# Patient Record
Sex: FEMALE | Race: WHITE | Employment: FULL TIME | URBAN - METROPOLITAN AREA
[De-identification: names, ages, dates, MRNs, and addresses within clinical notes are randomized per-mention and may not be internally consistent; named-entity substitution may affect disease eponyms.]

---

## 2017-01-23 DIAGNOSIS — H66.93 RECURRENT ACUTE OTITIS MEDIA OF BOTH EARS: Primary | ICD-10-CM

## 2017-01-24 NOTE — PROGRESS NOTES
Contacted with patient having symptoms of otitis media.  Will treat with augmentin, but if symptoms continue, will need to see her in a clinic visit.

## 2017-05-01 NOTE — PATIENT INSTRUCTIONS
Birth Control Methods  What is contraception?   Birth control and contraception are terms used to refer to ways to prevent pregnancy. There are many ways to prevent pregnancy when you have sexual intercourse. They include the use of hormone medicines, contraceptive devices (barriers and IUDs), periods of avoiding sex, spermicides, withdrawal, and devices and surgery for sterilization. Some birth control methods work better than others.  You may want to choose a kind of birth control that will also help keep you from getting sexually transmitted disease (STD). Sometimes you may need to use more than one method to try to prevent pregnancy AND infection. You can use latex or polyurethane male condoms or the female condom to try to avoid getting STDs. They are the only birth control methods that will lessen your risk of being infected with HIV, the virus that causes AIDS. Hormones, natural family planning, and withdrawal do not give any protection against infection.  What are the different methods of contraception?   Hormone Medicines  Birth control pills, shots, vaginal rings, skin patches, and implants contain manmade forms of the hormones estrogen and/or progesterone. The hormones stop a woman's ovaries from releasing an egg each month. They also make it harder for sperm to get into the uterus or for a fertilized egg to stay in the uterus.  Birth control pills are taken according to a daily schedule prescribed by your healthcare provider.   One shot of Depo-Provera, which contains progesterone, can prevent pregnancy for 3 months.   Vaginal rings are flexible rings put into the vagina. The rings release hormones into the body. A ring stays in the vagina for 3 weeks. Then it is removed. After 1 week a new ring is put into the vagina and the cycle is repeated.   Patches containing hormones may be put on the skin. A new patch is worn every week for 3 weeks. During the 4th week, no patch is worn. Then the  cycle is repeated.   The implant (Implanon) is a small, thin capsule containing progesterone. It is put under the skin of a woman's arm. The implant prevents pregnancy for up to 3 years.  You will need to see your healthcare provider to get any of these hormonal forms of birth control.  Contraceptive Devices  Most contraceptive devices form physical or chemical barriers that stop sperm from getting into the uterus.  The male condom is a tube of thin material. (Latex rubber or polyurethane is best.) It is rolled over the erect penis before the penis touches any part of a woman's genital area. The male condom is the best protection against STDs, including HIV and hepatitis B.   The female condom is a 7-inch-long pouch. The pouch is made of polyurethane. It has 2 flexible rings. It is inserted into the vagina before sex. The female condom covers the cervix, vagina, and area around the vagina. It may protect against some STDs, such as HIV and hepatitis B.   Spermicides are chemicals that kill sperm. They are available as foam, jelly, foaming tablets, vaginal suppositories, or cream. They are inserted into the vagina no more than 30 minutes before sex. Spermicides should NOT be used alone. They work much better when they are used with another form of birth control, such as a condom or diaphragm. Spermicides do not protect against STDs.   The sponge is a round, soft piece of polyurethane foam. It is soaked with a spermicide. No more than 24 hours before intercourse, the sponge is dampened with water and inserted into the vagina against the cervix.   The diaphragm is a soft rubber dome stretched over a flexible ring. No more than 6 hours before sex, you fill the diaphragm with a spermicidal jelly or cream and put it into the vagina.   The intrauterine device (IUD) is a small plastic device containing copper or hormones. The IUD prevents the egg from being fertilized or implanting and growing in the uterus. An IUD is put  into the uterus by your healthcare provider. Depending on the type, it may be kept in the uterus 5 to 10 years before it must be replaced with a new one.  You can buy condoms, spermicides, and sponges at drug and grocery stores without a prescription. A diaphragm needs to be fitted by a healthcare provider. If you choose to use an IUD, you will need to see your provider for insertion of the IUD.  Sterilization  Sterilization is a procedure done to close the tubes that carry the sperm or eggs. It may be done with surgery or special devices put into the tubes. A woman or man who is sterilized will no longer be able to have children. These procedures are usually permanent methods of birth control.   Removal of the uterus (hysterectomy) causes a woman to be sterile and unable to get pregnant. Hysterectomy is usually only done if there are problems with the female organs, such as fibroids or abnormal menstrual bleeding.  Natural Family Planning (Periodic Abstinence) and the Withdrawal Method  The natural family planning methods of birth control do not use any devices, drugs, or surgery. To prevent pregnancy you avoid having sex on certain days of each menstrual cycle. Other methods of birth control are more reliable.  The withdrawal method involves removing the penis from the vagina before ejaculation, when semen starts coming out. Often sperm get into the vagina before or during withdrawal. This method is unreliable. It often does not prevent pregnancy.  Emergency Contraception  A pill for emergency birth control may be taken to prevent pregnancy soon after you have had sex without birth control. It may also be used when another method of birth control has failed (for example, if a condom breaks). The pill contains a hormone that will prevent pregnancy if it is taken very soon after intercourse (within 3 to 5 days, depending on the medicine). In many areas, the pills are available at drug stores without a prescription  for women over 18 years old.   A copper intrauterine device (IUD) is another type of emergency birth control. The IUD may be put into the uterus by your healthcare provider within 5 days after unprotected sex. It may prevent pregnancy by stopping fertilization or keeping a fertilized egg from attaching to the womb.  How well do the various methods prevent pregnancy?   The following chart shows the typical failure rates of the different kinds of birth control methods. The failure rate is the number of pregnancies expected per 100 women during 1 year of using each method. The failure rate can depend on how correctly each method is followed. If a method is used perfectly, the failure rate is lower than the typical rate shown here. Use of more than 1 method (for example, birth control pills and condoms) can decrease the chances of failure.                               Percentage of Women Having an     Birth Control             Unintended Pregnancy within the        Method                       First Year of Use    -----------------------------------------------------------                                   Typical Use    Perfect Use    -----------------------------------------------------------   Spermicides                         29%          18%   Natural Family Planning     (Periodic Abstinence)                           9%     Symptothermal method                            2%   Withdrawal                          18%           4%   Diaphragm with Spermicide           16%           6%   Condom     Female                            27%           5%     Male                              17%           2%   Sponge     Women who have given birth        32%          26%     Women who have not given birth    16%           9%   Pill                                 9%           0.3%   IUD     with copper                        1%           0.6%     with hormones                      0.1%         0.1%   Shot (Depo-Provera)                   7%           0.3%   Implant                              1%           0.05%   Patch (Ortho Evra)                   8%           0.3%   Vaginal ring (NuvaRing)              8%           0.3%   Female Sterilization                 0.7%         0.5%   Male Sterilization                   0.2%         0.1%   No Method                           85%          85%   -----------------------------------------------------------      Note: These failure rates are modified from the [facility] January 2008. Facts on Contraceptive Use. Available at http://www.guttmacher.org/pubs/fb_contr_use.html        Preventive Health Recommendations  Female Ages 18 to 25     Yearly exam:     See your health care provider every year in order to  o Review health changes.   o Discuss preventive care.    o Review your medicines if your doctor has prescribed any.      You should be tested each year for STDs (sexually transmitted diseases).       After age 20, talk to your provider about how often you should have cholesterol testing.      Starting at age 21, get a Pap test every three years. If you have an abnormal result, your doctor may have you test more often.      If you are at risk for diabetes, you should have a diabetes test (fasting glucose).     Shots:     Get a flu shot each year.     Get a tetanus shot every 10 years.     Consider getting the shot (vaccine) that prevents cervical cancer (Gardasil).    Nutrition:     Eat at least 5 servings of fruits and vegetables each day.    Eat whole-grain bread, whole-wheat pasta and brown rice instead of white grains and rice.    Talk to your provider about Calcium and Vitamin D.     Lifestyle    Exercise at least 150 minutes a week each week (30 minutes a day, 5 days a week). This will help you control your weight and prevent disease.    Limit alcohol to one drink per day.    No smoking.     Wear sunscreen to prevent skin cancer.    See your dentist every six months for an  exam and cleaning.    [facility]    If you have any questions regarding to your visit please contact your care team:       Team Red:   Clinic Hours Telephone Number   Dr. Jackie Solo, NP   7am-7pm  Monday - Thursday   7am-5pm  Fridays  [phone]  (Appointment scheduling available 24/7)    Questions about your visit?   Team Line  [phone]   Urgent Care - [place] and [facility] - 11am-9pm Monday-Friday Saturday-Sunday- 9am-5pm   [place] - 5pm-9pm Monday-Friday Saturday-Sunday- 9am-5pm  [phone] - [facility]  [phone] - [place]       What options do I have for visits at the clinic other than the traditional office visit?  To expand how we care for you, many of our providers are utilizing electronic visits (e-visits) and telephone visits, when medically appropriate, for interactions with their patients rather than a visit in the clinic.   We also offer nurse visits for many medical concerns. Just like any other service, we will bill your insurance company for this type of visit based on time spent on the phone with your provider. Not all insurance companies cover these visits. Please check with your medical insurance if this type of visit is covered. You will be responsible for any charges that are not paid by your insurance.      E-visits via [employer]:  generally incur a $35.00 fee.  Telephone visits:  Time spent on the phone: *charged based on time that is spent on the phone in increments of 10 minutes. Estimated cost:   5-10 mins $30.00   11-20 mins. $59.00   21-30 mins. $85.00     Use [employer] (secure email communication and access to your chart) to send your primary care provider a message or make an appointment. Ask someone on your Team how to sign up for [employer].  For a Price Quote for your services, please call our Consumer Price Line at [phone].      As always, Thank you for trusting us with your health  care needs!    Discharged by Essie Mackey MA.

## 2017-05-01 NOTE — PROGRESS NOTES
SUBJECTIVE:     CC: Gayathri Llamas is an 22 year old woman who presents for preventive health visit.     Healthy Habits:    Do you get at least three servings of calcium containing foods daily (dairy, green leafy vegetables, etc.)? yes    Amount of exercise or daily activities, outside of work: none    Problems taking medications regularly No    Medication side effects: No    Have you had an eye exam in the past two years? no    Do you see a dentist twice per year? yes    Do you have sleep apnea, excessive snoring or daytime drowsiness?no        {additional problems to add:466580}    Today's PHQ-2 Score:   PHQ-2 ( 1999 Pfizer) 5/6/2013   Q1: Little interest or pleasure in doing things 0   Q2: Feeling down, depressed or hopeless 0   PHQ-2 Score 0       Abuse: Current or Past(Physical, Sexual or Emotional)- No  Do you feel safe in your environment - Yes    Social History   Substance Use Topics     Smoking status: Never Smoker     Smokeless tobacco: Never Used     Alcohol use No     The patient does not drink >3 drinks per day nor >7 drinks per week.    Recent Labs   Lab Test  10/04/16   1123  07/21/14   1303  10/07/11   1128   CHOL  123  145  120   HDL  52   --   45*   LDL  60   --   62   TRIG  55   --   65   CHOLHDLRATIO   --    --   2.7   NHDL  71   --    --        Reviewed orders with patient.  Reviewed health maintenance and updated orders accordingly - Yes    Mammo Decision Support:  {Mammo:640675}    Pertinent mammograms are reviewed under the imaging tab.  History of abnormal Pap smear: {PAP HX:770319}    Reviewed and updated as needed this visit by clinical staff         Reviewed and updated as needed this visit by Provider        {HISTORY OPTIONS:409797}    ROS:  {FEMALE PREVENTATIVE ROS:984494}    {CHRONICPROBDATA:446074}  OBJECTIVE:     There were no vitals taken for this visit.  EXAM:  {Exam Choices:439688}    ASSESSMENT/PLAN:     {Diag Picklist:370134}    COUNSELING:   {FEMALE COUNSELING  "MESSAGES:279734::\"Reviewed preventive health counseling, as reflected in patient instructions\"}    {Blood Pressure Screenin}     reports that she has never smoked. She has never used smokeless tobacco.  {Tobacco Cessation needed for ACO -- Delete if patient is a non-smoker:158302}  Estimated body mass index is 52.95 kg/(m^2) as calculated from the following:    Height as of 10/4/16: 5' 10\" (1.778 m).    Weight as of 16: 369 lb (167.4 kg).   {Weight Management Plan needed for ACO:578445}    Counseling Resources:  ATP IV Guidelines  Pooled Cohorts Equation Calculator  Breast Cancer Risk Calculator  FRAX Risk Assessment  ICSI Preventive Guidelines  Dietary Guidelines for Americans, 2010  USDA's MyPlate  ASA Prophylaxis  Lung CA Screening    EDUARDA Doan [facility] Center[facility]ERST[facility]  "

## 2017-05-02 DIAGNOSIS — Z30.011 ENCOUNTER FOR INITIAL PRESCRIPTION OF CONTRACEPTIVE PILLS: ICD-10-CM

## 2017-05-02 RX ORDER — NORETHINDRONE ACETATE AND ETHINYL ESTRADIOL .02; 1 MG/1; MG/1
1 TABLET ORAL DAILY
Qty: 63 TABLET | Refills: 4 | Status: SHIPPED | OUTPATIENT
Start: 2017-05-02 | End: 2019-12-02

## 2017-05-02 NOTE — TELEPHONE ENCOUNTER
norethindrone-ethinyl estradiol (MICROGESTIN 1/20) 1-20 MG-MCG per tablet    Sent the message to the prescribing provider JOSIANE Gilliland out of [facility]. Cammy Solo CNP is out of office.

## 2017-05-02 NOTE — TELEPHONE ENCOUNTER
Reason for Call:  Other prescription    Detailed comments: Patient states she is in [place] and forgot to take her birthcontrol meds with. Requesting a month worth prescription to be sent [facility] [phone]. Patient scheduled a physical with you on May 11th .     Phone Number Patient can be reached at: Cell number on file:    Telephone Information:   Mobile [phone]       Best Time: any    Can we leave a detailed message on this number? YES    Call taken on 5/2/2017 at 1:06 PM by Elyse Forde

## 2017-05-11 ENCOUNTER — OFFICE VISIT (OUTPATIENT)
Dept: FAMILY MEDICINE | Facility: CLINIC | Age: 22
End: 2017-05-11
Payer: COMMERCIAL

## 2017-05-11 VITALS
DIASTOLIC BLOOD PRESSURE: 90 MMHG | BODY MASS INDEX: 41.02 KG/M2 | OXYGEN SATURATION: 98 % | WEIGHT: 293 LBS | SYSTOLIC BLOOD PRESSURE: 110 MMHG | HEIGHT: 71 IN | TEMPERATURE: 98 F | HEART RATE: 76 BPM

## 2017-05-11 DIAGNOSIS — Z12.4 SCREENING FOR CERVICAL CANCER: ICD-10-CM

## 2017-05-11 DIAGNOSIS — E66.01 MORBID OBESITY DUE TO EXCESS CALORIES (H): ICD-10-CM

## 2017-05-11 DIAGNOSIS — Z30.09 ENCOUNTER FOR OTHER GENERAL COUNSELING OR ADVICE ON CONTRACEPTION: Primary | ICD-10-CM

## 2017-05-11 DIAGNOSIS — Z11.3 SCREEN FOR STD (SEXUALLY TRANSMITTED DISEASE): ICD-10-CM

## 2017-05-11 PROCEDURE — 99213 OFFICE O/P EST LOW 20 MIN: CPT | Performed by: NURSE PRACTITIONER

## 2017-05-11 PROCEDURE — G0145 SCR C/V CYTO,THINLAYER,RESCR: HCPCS | Performed by: NURSE PRACTITIONER

## 2017-05-11 PROCEDURE — 87491 CHLMYD TRACH DNA AMP PROBE: CPT | Performed by: NURSE PRACTITIONER

## 2017-05-11 PROCEDURE — 87591 N.GONORRHOEAE DNA AMP PROB: CPT | Performed by: NURSE PRACTITIONER

## 2017-05-11 NOTE — MR AVS SNAPSHOT
After Visit Summary   5/11/2017    Gayathri Llamas    MRN: [id number]           Patient Information     Date Of Birth          1995        Visit Information        Provider Department      5/11/2017 11:20 AM Cammy Solo APRN [facility]        Today's Diagnoses     Encounter for other general counseling or advice on contraception    -  1    Screen for STD (sexually transmitted disease)        Screening for cervical cancer          Care Instructions                    Birth Control Methods  What is contraception?   Birth control and contraception are terms used to refer to ways to prevent pregnancy. There are many ways to prevent pregnancy when you have sexual intercourse. They include the use of hormone medicines, contraceptive devices (barriers and IUDs), periods of avoiding sex, spermicides, withdrawal, and devices and surgery for sterilization. Some birth control methods work better than others.  You may want to choose a kind of birth control that will also help keep you from getting sexually transmitted disease (STD). Sometimes you may need to use more than one method to try to prevent pregnancy AND infection. You can use latex or polyurethane male condoms or the female condom to try to avoid getting STDs. They are the only birth control methods that will lessen your risk of being infected with HIV, the virus that causes AIDS. Hormones, natural family planning, and withdrawal do not give any protection against infection.  What are the different methods of contraception?   Hormone Medicines  Birth control pills, shots, vaginal rings, skin patches, and implants contain manmade forms of the hormones estrogen and/or progesterone. The hormones stop a woman's ovaries from releasing an egg each month. They also make it harder for sperm to get into the uterus or for a fertilized egg to stay in the uterus.  Birth control pills are taken according to a daily schedule  prescribed by your healthcare provider.   One shot of Depo-Provera, which contains progesterone, can prevent pregnancy for 3 months.   Vaginal rings are flexible rings put into the vagina. The rings release hormones into the body. A ring stays in the vagina for 3 weeks. Then it is removed. After 1 week a new ring is put into the vagina and the cycle is repeated.   Patches containing hormones may be put on the skin. A new patch is worn every week for 3 weeks. During the 4th week, no patch is worn. Then the cycle is repeated.   The implant (Implanon) is a small, thin capsule containing progesterone. It is put under the skin of a woman's arm. The implant prevents pregnancy for up to 3 years.  You will need to see your healthcare provider to get any of these hormonal forms of birth control.  Contraceptive Devices  Most contraceptive devices form physical or chemical barriers that stop sperm from getting into the uterus.  The male condom is a tube of thin material. (Latex rubber or polyurethane is best.) It is rolled over the erect penis before the penis touches any part of a woman's genital area. The male condom is the best protection against STDs, including HIV and hepatitis B.   The female condom is a 7-inch-long pouch. The pouch is made of polyurethane. It has 2 flexible rings. It is inserted into the vagina before sex. The female condom covers the cervix, vagina, and area around the vagina. It may protect against some STDs, such as HIV and hepatitis B.   Spermicides are chemicals that kill sperm. They are available as foam, jelly, foaming tablets, vaginal suppositories, or cream. They are inserted into the vagina no more than 30 minutes before sex. Spermicides should NOT be used alone. They work much better when they are used with another form of birth control, such as a condom or diaphragm. Spermicides do not protect against STDs.   The sponge is a round, soft piece of polyurethane foam. It is soaked with a  spermicide. No more than 24 hours before intercourse, the sponge is dampened with water and inserted into the vagina against the cervix.   The diaphragm is a soft rubber dome stretched over a flexible ring. No more than 6 hours before sex, you fill the diaphragm with a spermicidal jelly or cream and put it into the vagina.   The intrauterine device (IUD) is a small plastic device containing copper or hormones. The IUD prevents the egg from being fertilized or implanting and growing in the uterus. An IUD is put into the uterus by your healthcare provider. Depending on the type, it may be kept in the uterus 5 to 10 years before it must be replaced with a new one.  You can buy condoms, spermicides, and sponges at drug and grocery stores without a prescription. A diaphragm needs to be fitted by a healthcare provider. If you choose to use an IUD, you will need to see your provider for insertion of the IUD.  Sterilization  Sterilization is a procedure done to close the tubes that carry the sperm or eggs. It may be done with surgery or special devices put into the tubes. A woman or man who is sterilized will no longer be able to have children. These procedures are usually permanent methods of birth control.   Removal of the uterus (hysterectomy) causes a woman to be sterile and unable to get pregnant. Hysterectomy is usually only done if there are problems with the female organs, such as fibroids or abnormal menstrual bleeding.  Natural Family Planning (Periodic Abstinence) and the Withdrawal Method  The natural family planning methods of birth control do not use any devices, drugs, or surgery. To prevent pregnancy you avoid having sex on certain days of each menstrual cycle. Other methods of birth control are more reliable.  The withdrawal method involves removing the penis from the vagina before ejaculation, when semen starts coming out. Often sperm get into the vagina before or during withdrawal. This method is  unreliable. It often does not prevent pregnancy.  Emergency Contraception  A pill for emergency birth control may be taken to prevent pregnancy soon after you have had sex without birth control. It may also be used when another method of birth control has failed (for example, if a condom breaks). The pill contains a hormone that will prevent pregnancy if it is taken very soon after intercourse (within 3 to 5 days, depending on the medicine). In many areas, the pills are available at drug stores without a prescription for women over 18 years old.   A copper intrauterine device (IUD) is another type of emergency birth control. The IUD may be put into the uterus by your healthcare provider within 5 days after unprotected sex. It may prevent pregnancy by stopping fertilization or keeping a fertilized egg from attaching to the womb.  How well do the various methods prevent pregnancy?   The following chart shows the typical failure rates of the different kinds of birth control methods. The failure rate is the number of pregnancies expected per 100 women during 1 year of using each method. The failure rate can depend on how correctly each method is followed. If a method is used perfectly, the failure rate is lower than the typical rate shown here. Use of more than 1 method (for example, birth control pills and condoms) can decrease the chances of failure.                               Percentage of Women Having an     Birth Control             Unintended Pregnancy within the        Method                       First Year of Use    -----------------------------------------------------------                                   Typical Use    Perfect Use    -----------------------------------------------------------   Spermicides                         29%          18%   Natural Family Planning     (Periodic Abstinence)                           9%     Symptothermal method                            2%   Withdrawal                           18%           4%   Diaphragm with Spermicide           16%           6%   Condom     Female                            27%           5%     Male                              17%           2%   Sponge     Women who have given birth        32%          26%     Women who have not given birth    16%           9%   Pill                                 9%           0.3%   IUD     with copper                        1%           0.6%     with hormones                      0.1%         0.1%   Shot (Depo-Provera)                  7%           0.3%   Implant                              1%           0.05%   Patch (Ortho Evra)                   8%           0.3%   Vaginal ring (NuvaRing)              8%           0.3%   Female Sterilization                 0.7%         0.5%   Male Sterilization                   0.2%         0.1%   No Method                           85%          85%   -----------------------------------------------------------      Note: These failure rates are modified from the [facility] [place] [facility] January 2008. Facts on Contraceptive Use. Available at http://www.[facility]macher.org/pubs/fb_contr_use.html        Preventive Health Recommendations  Female Ages 18 to 25     Yearly exam:     See your health care provider every year in order to  o Review health changes.   o Discuss preventive care.    o Review your medicines if your doctor has prescribed any.      You should be tested each year for STDs (sexually transmitted diseases).       After age 20, talk to your provider about how often you should have cholesterol testing.      Starting at age 21, get a Pap test every three years. If you have an abnormal result, your doctor may have you test more often.      If you are at risk for diabetes, you should have a diabetes test (fasting glucose).     Shots:     Get a flu shot each year.     Get a tetanus shot every 10 years.     Consider getting the shot (vaccine) that prevents cervical cancer  (Gardasil).    Nutrition:     Eat at least 5 servings of fruits and vegetables each day.    Eat whole-grain bread, whole-wheat pasta and brown rice instead of white grains and rice.    Talk to your provider about Calcium and Vitamin D.     Lifestyle    Exercise at least 150 minutes a week each week (30 minutes a day, 5 days a week). This will help you control your weight and prevent disease.    Limit alcohol to one drink per day.    No smoking.     Wear sunscreen to prevent skin cancer.    See your dentist every six months for an exam and cleaning.    [facility]    If you have any questions regarding to your visit please contact your care team:       Team Red:   Clinic Hours Telephone Number   Dr. Jackie Solo, NP   7am-7pm  Monday - Thursday   7am-5pm  Fridays  [phone]  (Appointment scheduling available 24/7)    Questions about your visit?   Team Line  [phone]   Urgent Care - [place] [facility] and [place] [facility] - 11am-9pm Monday-Friday Saturday-Sunday- 9am-5pm   [place] - 5pm-9pm Monday-Friday Saturday-Sunday- 9am-5pm  [phone] - [place]   [phone] - [place]       What options do I have for visits at the clinic other than the traditional office visit?  To expand how we care for you, many of our providers are utilizing electronic visits (e-visits) and telephone visits, when medically appropriate, for interactions with their patients rather than a visit in the clinic.   We also offer nurse visits for many medical concerns. Just like any other service, we will bill your insurance company for this type of visit based on time spent on the phone with your provider. Not all insurance companies cover these visits. Please check with your medical insurance if this type of visit is covered. You will be responsible for any charges that are not paid by your insurance.      E-visits via [employer]:  generally incur a $35.00 fee.  Telephone  visits:  Time spent on the phone: *charged based on time that is spent on the phone in increments of 10 minutes. Estimated cost:   5-10 mins $30.00   11-20 mins. $59.00   21-30 mins. $85.00     Use RFIDeashart (secure email communication and access to your chart) to send your primary care provider a message or make an appointment. Ask someone on your Team how to sign up for iPour[employer].  For a Price Quote for your services, please call our [employer] Price Line at [phone].      As always, Thank you for trusting us with your health care needs!    Discharged by Essie Mackey MA.          Follow-ups after your visit        Additional Services     OB/GYN REFERRAL       Your provider has referred you to:  FMG: [facility] [phone]   [email or web address]     Please be aware that coverage of these services is subject to the terms and limitations of your health insurance plan.  Call member services at your health plan with any benefit or coverage questions.      Please bring the following to your appointment:  >>   Any x-rays, CTs or MRIs which have been performed.  Contact the facility where they were done to arrange for  prior to your scheduled appointment.  Any new CT, MRI or other procedures ordered by your specialist must be performed at a [place] facility or coordinated by your clinic's referral office.    >>   List of current medications   >>   This referral request   >>   Any documents/labs given to you for this referral                  Who to contact     If you have questions or need follow up information about today's clinic visit or your schedule please contact [facility] directly at [phone].  Normal or non-critical lab and imaging results will be communicated to you by MyChart, letter or phone within 4 business days after the clinic has received the results. If you do not hear from us within 7 days, please contact  "the clinic through [employer] or phone. If you have a critical or abnormal lab result, we will notify you by phone as soon as possible.  Submit refill requests through [employer] or call your pharmacy and they will forward the refill request to us. Please allow 3 business days for your refill to be completed.          Additional Information About Your Visit        [employer]cepthart Information     [employer] lets you send messages to your doctor, view your test results, renew your prescriptions, schedule appointments and more. To sign up, go to [email or web address] . Click on \"Log in\" on the left side of the screen, which will take you to the Welcome page. Then click on \"Sign up Now\" on the right side of the page.     You will be asked to enter the access code listed below, as well as some personal information. Please follow the directions to create your username and password.     Your access code is: [email or web address]  Expires: 2017 12:44 PM     Your access code will  in 90 days. If you need help or a new code, please call your [place] clinic or [phone].        Care EveryWhere ID     This is your Care EveryWhere ID. This could be used by other organizations to access your [place] medical records  [id number]        Your Vitals Were     Pulse Temperature Height Last Period Pulse Oximetry Breastfeeding?    76 98  F (36.7  C) (Oral) 5' 10.5\" (1.791 m) 2017 98% No    BMI (Body Mass Index)                   52.2 kg/m2            Blood Pressure from Last 3 Encounters:   17 110/90   16 113/62   10/04/16 120/60    Weight from Last 3 Encounters:   17 (!) 369 lb (167.4 kg)   16 (!) 369 lb (167.4 kg)   10/04/16 (!) 362 lb 2 oz (164.3 kg)              We Performed the Following     CHLAMYDIA TRACHOMATIS PCR     NEISSERIA GONORRHOEA PCR     OB/GYN REFERRAL     Pap imaged thin layer screen only - recommended age 21 - 24 years        Primary Care Provider Office Phone # Fax #    Malissa Ocampo " MD Emily [phone]       [facility] [address]    [facility] [place] [address]        Thank you!     Thank you for choosing [facility]  for your care. Our goal is always to provide you with excellent care. Hearing back from our patients is one way we can continue to improve our services. Please take a few minutes to complete the written survey that you may receive in the mail after your visit with us. Thank you!             Your Updated Medication List - Protect others around you: Learn how to safely use, store and throw away your medicines at www.disposemymeds.org.          This list is accurate as of: 5/11/17 12:44 PM.  Always use your most recent med list.                   Brand Name Dispense Instructions for use    ADVIL 200 MG tablet   Generic drug:  ibuprofen      Take 400 mg by mouth every 4 hours as needed.       norethindrone-ethinyl estradiol 1-20 MG-MCG per tablet    MICROGESTIN 1/20    63 tablet    Take 1 tablet by mouth daily

## 2017-05-11 NOTE — NURSING NOTE
"Chief Complaint   Patient presents with     Physical       Initial /90 (BP Location: Other (Comment), Patient Position: Chair, Cuff Size: Adult Regular)  Pulse 76  Temp 98  F (36.7  C) (Oral)  Ht 5' 10.5\" (1.791 m)  Wt (!) 369 lb (167.4 kg)  LMP 04/27/2017  SpO2 98%  Breastfeeding? No  BMI 52.2 kg/m2 Estimated body mass index is 52.2 kg/(m^2) as calculated from the following:    Height as of this encounter: 5' 10.5\" (1.791 m).    Weight as of this encounter: 369 lb (167.4 kg).  Medication Reconciliation: complete   Jane VIRAMONTES MA      "

## 2017-05-11 NOTE — PROGRESS NOTES
"  SUBJECTIVE:                                                    Gayathri Llamas is a 22 year old female who presents to clinic today for the following health issues:      Chief Complaint   Patient presents with     Contraception     Patient currently on oral contraception. Sexually active with 1 long-term partner. Does not desire pregnancy. Wants something she doesn't need to take every day.      Problem list and histories reviewed & adjusted, as indicated.  Additional history: as documented    Patient Active Problem List   Diagnosis     Vitamin D deficiency     Morbid obesity (H)     Hidradenitis suppurativa     ALT (SGPT) level raised     Myopia     History reviewed. No pertinent surgical history.    Social History   Substance Use Topics     Smoking status: Never Smoker     Smokeless tobacco: Never Used     Alcohol use Yes     Family History   Problem Relation Age of Onset     DIABETES Father      CANCER Maternal Aunt      Ovarian Cancer - earliest at age 33     CANCER Maternal Grandmother      CANCER Brother      Hypertension No family hx of      CEREBROVASCULAR DISEASE No family hx of      Thyroid Disease No family hx of      Glaucoma No family hx of      Macular Degeneration No family hx of            Reviewed and updated as needed this visit by clinical staff  Tobacco  Allergies  Meds  Problems  Med Hx  Surg Hx  Fam Hx  Soc Hx        Reviewed and updated as needed this visit by Provider  Problems         ROS:  Constitutional, gu systems are negative, except as otherwise noted.    OBJECTIVE:                                                    /90 (BP Location: Other (Comment), Patient Position: Chair, Cuff Size: Adult Regular)  Pulse 76  Temp 98  F (36.7  C) (Oral)  Ht 5' 10.5\" (1.791 m)  Wt (!) 369 lb (167.4 kg)  LMP 04/27/2017  SpO2 98%  Breastfeeding? No  BMI 52.2 kg/m2  Body mass index is 52.2 kg/(m^2).  GENERAL: healthy, alert and no distress   (female): normal female external " genitalia, normal urethral meatus, vaginal mucosa, normal cervix/adnexa/uterus without masses or discharge    Diagnostic Test Results:  No results found for this or any previous visit (from the past 24 hour(s)).     ASSESSMENT/PLAN:                                                        1. Encounter for other general counseling or advice on contraception  Reviewed contraceptive options with patient and she is interested in Mirena IUD. I think she would be a good candidate- os is fairly dilated for a nullip although her morbid obesity will make this a difficult insertion so recommend she follow up with GYN.  - OB/GYN REFERRAL    2. Screen for STD (sexually transmitted disease)    - NEISSERIA GONORRHOEA PCR  - CHLAMYDIA TRACHOMATIS PCR    3. Screening for cervical cancer    - Pap imaged thin layer screen only - recommended age 21 - 24 years    4. Morbid obesity due to excess calories (H)  As above      Follow up with OBGYN    EDUARDA Doan [facility]

## 2017-05-12 LAB
C TRACH DNA SPEC QL NAA+PROBE: NORMAL
N GONORRHOEA DNA SPEC QL NAA+PROBE: NORMAL
SPECIMEN SOURCE: NORMAL
SPECIMEN SOURCE: NORMAL

## 2017-05-15 LAB
COPATH REPORT: NORMAL
PAP: NORMAL

## 2017-05-26 ENCOUNTER — OFFICE VISIT (OUTPATIENT)
Dept: OBGYN | Facility: CLINIC | Age: 22
End: 2017-05-26
Attending: NURSE PRACTITIONER
Payer: COMMERCIAL

## 2017-05-26 VITALS — HEART RATE: 80 BPM | HEIGHT: 71 IN | BODY MASS INDEX: 41.02 KG/M2 | WEIGHT: 293 LBS

## 2017-05-26 DIAGNOSIS — Z30.430 ENCOUNTER FOR IUD INSERTION: Primary | ICD-10-CM

## 2017-05-26 DIAGNOSIS — Z32.00 PREGNANCY EXAMINATION OR TEST, PREGNANCY UNCONFIRMED: ICD-10-CM

## 2017-05-26 LAB — BETA HCG QUAL IFA URINE: NEGATIVE

## 2017-05-26 PROCEDURE — 99202 OFFICE O/P NEW SF 15 MIN: CPT | Mod: 25 | Performed by: OBSTETRICS & GYNECOLOGY

## 2017-05-26 PROCEDURE — 84703 CHORIONIC GONADOTROPIN ASSAY: CPT | Performed by: OBSTETRICS & GYNECOLOGY

## 2017-05-26 PROCEDURE — 58300 INSERT INTRAUTERINE DEVICE: CPT | Performed by: OBSTETRICS & GYNECOLOGY

## 2017-05-26 ASSESSMENT — PAIN SCALES - GENERAL: PAINLEVEL: NO PAIN (0)

## 2017-05-26 NOTE — MR AVS SNAPSHOT
"              After Visit Summary   5/26/2017    Gayathri Llamas    MRN: [id number]           Patient Information     Date Of Birth          1995        Visit Information        Provider Department      5/26/2017 10:00 AM Maggie Guzmán MD [facility]        Today's Diagnoses     Encounter for IUD insertion    -  1    Pregnancy examination or test, pregnancy unconfirmed           Follow-ups after your visit        Follow-up notes from your care team     Return in about 3 months (around 8/26/2017).      Who to contact     If you have questions or need follow up information about today's clinic visit or your schedule please contact [facility] directly at [phone].  Normal or non-critical lab and imaging results will be communicated to you by MyChart, letter or phone within 4 business days after the clinic has received the results. If you do not hear from us within 7 days, please contact the clinic through [employer]Doch[employer] or phone. If you have a critical or abnormal lab result, we will notify you by phone as soon as possible.  Submit refill requests through [employer] or call your pharmacy and they will forward the refill request to us. Please allow 3 business days for your refill to be completed.          Additional Information About Your Visit        MyChart Information     [employer] gives you secure access to your electronic health record. If you see a primary care provider, you can also send messages to your care team and make appointments. If you have questions, please call your primary care clinic.  If you do not have a primary care provider, please call [phone] and they will assist you.        Care EveryWhere ID     This is your Care EveryWhere ID. This could be used by other organizations to access your [place] medical records  [id number]        Your Vitals Were     Pulse Height Last Period BMI (Body Mass Index)          80 1.791 m (5' 10.5\") 05/25/2017 47.66 kg/m2      "    Blood Pressure from Last 3 Encounters:   05/11/17 110/90   11/17/16 113/62   10/04/16 120/60    Weight from Last 3 Encounters:   05/26/17 (!) 152.8 kg (336 lb 14.4 oz)   05/11/17 (!) 167.4 kg (369 lb)   11/17/16 (!) 167.4 kg (369 lb)              We Performed the Following     Beta HCG qual IFA urine     HC LEVONORGESTREL IU 52MG 5 YR     INSERTION INTRAUTERINE DEVICE        Primary Care Provider Office Phone # Fax #    Malissa Joan Diaz [phone]       [facility] [address]    [facility] [place] [address]        Thank you!     Thank you for choosing [facility]  for your care. Our goal is always to provide you with excellent care. Hearing back from our patients is one way we can continue to improve our services. Please take a few minutes to complete the written survey that you may receive in the mail after your visit with us. Thank you!             Your Updated Medication List - Protect others around you: Learn how to safely use, store and throw away your medicines at www.disposemymeds.org.          This list is accurate as of: 5/26/17 12:04 PM.  Always use your most recent med list.                   Brand Name Dispense Instructions for use    ADVIL 200 MG tablet   Generic drug:  ibuprofen      Take 400 mg by mouth every 4 hours as needed.       norethindrone-ethinyl estradiol 1-20 MG-MCG per tablet    MICROGESTIN 1/20    63 tablet    Take 1 tablet by mouth daily

## 2017-05-26 NOTE — NURSING NOTE
"Chief Complaint   Patient presents with     Contraception     IUD consult/   consent /  UPT ?       Initial Pulse 80  Ht 1.791 m (5' 10.5\")  Wt (!) 152.8 kg (336 lb 14.4 oz)  LMP 05/25/2017  BMI 47.66 kg/m2 Estimated body mass index is 47.66 kg/(m^2) as calculated from the following:    Height as of this encounter: 1.791 m (5' 10.5\").    Weight as of this encounter: 152.8 kg (336 lb 14.4 oz).  BP completed using cuff size: NA (Not Taken)    No obstetric history on file.    The following HM Due: NONE      The following patient reported/Care Every where data was sent to:  P ABSTRACT QUALITY INITIATIVES [24280]  n/a     N/a    Judy Angulo CMA  May 26, 2017           "

## 2017-05-26 NOTE — PROGRESS NOTES
OB/GYN New Consult  5/26/2017      NAME:  Gayathri Llamas  PCP:  Malissa Velez  MRN:  [id number]    Reason for Consult:  IUD  Referring Provider: Cammy Solo    Impression / Plan     22 year old with:      ICD-10-CM    1. Encounter for IUD insertion Z30.430 INSERTION INTRAUTERINE DEVICE     HC LEVONORGESTREL IU 52MG 5 YR   2. Pregnancy examination or test, pregnancy unconfirmed Z32.00 Beta HCG qual IFA urine     Beta HCG qual IFA urine   Body mass index is 47.66 kg/(m^2).     Contraceptive options were discussed. We also discussed the different types of IUDs including the risks and benefits. See below. IUD was inserted today, see below.    Patient will follow up in 3 months, sooner as needed    Chief Complaint     Chief Complaint   Patient presents with     Contraception     IUD consult/   consent /  UPT ?       HPI     Gayathri Llamas is a G0 22 year old female who is seen for IUD.  Patient saw Cammy Solo on 5/11/17 to discuss contraception.  Patient was interested in the Mirena IUD and was sent here for placement.  Chlamydia and GC were negative at that time.     Patient's last menstrual period was 05/25/2017.   Period were regular, heavy, lasting 7 days prior to staring the pill 7 months ago.  Now they are lighter, 4 days.  She has no side effects from the pill but prefers to not take a pill everyday.      Today no pain, no abnormal discharge.  No concern.      Date of Last Pap Smear:   Lab Results   Component Value Date    PAP NIL 05/11/2017       Problem List     Patient Active Problem List    Diagnosis Date Noted     Myopia 05/01/2015     Priority: Medium     ALT (SGPT) level raised 07/22/2014     Priority: Medium     Hidradenitis suppurativa 07/21/2014     Priority: Medium     Morbid obesity (H) 05/06/2013     Priority: Medium     Vitamin D deficiency 10/11/2011     Priority: Medium       Medications     Current Outpatient Prescriptions   Medication     norethindrone-ethinyl  "estradiol (MICROGESTIN 1/20) 1-20 MG-MCG per tablet     ibuprofen (ADVIL) 200 MG tablet     No current facility-administered medications for this visit.         Allergies     Allergies   Allergen Reactions     Tamiflu [Oseltamivir] Shortness Of Breath and Swelling     Sulfa Drugs Hives     hives       Past Medical/Surgical History     Past Medical History:   Diagnosis Date     Constipation      Menorrhagia     Tx-ORTHOTRICYCLIN       History reviewed. No pertinent surgical history.     Social History     Social History     Social History     Marital status: Single     Spouse name: N/A     Number of children: N/A     Years of education: N/A     Occupational History     Not on file.     Social History Main Topics     Smoking status: Never Smoker     Smokeless tobacco: Never Used     Alcohol use Yes     Drug use: No     Sexual activity: Yes     Partners: Male     Birth control/ protection: Condom, Pill     Other Topics Concern     Not on file     Social History Narrative       Family History      Family History   Problem Relation Age of Onset     DIABETES Father      CANCER Maternal Aunt      Ovarian Cancer - earliest at age 33     CANCER Maternal Grandmother      CANCER Brother      Hypertension No family hx of      CEREBROVASCULAR DISEASE No family hx of      Thyroid Disease No family hx of      Glaucoma No family hx of      Macular Degeneration No family hx of        ROS     A 6 organ review of systems was asked and the pertinent positives and negatives are listed in the HPI. All other organ systems can be considered negative.     Physical Exam   Vitals: Pulse 80  Ht 1.791 m (5' 10.5\")  Wt (!) 152.8 kg (336 lb 14.4 oz)  LMP 05/25/2017  BMI 47.66 kg/m2    General: Comfortable, no obvious distress, obese body habitus  Psych: Alert and orientated x 3. Appropriate affect, good insight.   : Normal female external genitalia.  No lesions.  Urethral meatus normal.  Speculum exam reveals a normal vaginal vault, normal " cervix.  Small amount of menstrual blood present. Bimanual exam reveals a normal, mobile, nontender uterus.  No cervical motion tenderness.  Adnexa nontender with no palpable masses.  Exam limited by habitus  Extremities: No peripheral edema, nontender       Labs/Imaging       Labs were reviewed in Epic       20 minutes was spent with patient, more than 50% counseling and coordinating care, exclusive of IUD insertion    Maggie Guzmán MD       IUD INSERTION  Gayathri Llamas is a 22 year old  Women who presents today requesting placement of an Mirena iud.    The patient meets and is agreeable to the following conditions:    TShe denies the possibility of pregnancy.     Pregnancy test today is negative.     We discussed risks, benefits, and alternatives including but not limited to:     Possibility of pregnancy and ectopic pregnancy.    Possibility of pelvic inflammatory disease, particularly in the first 20 days and with new partners.    Risk of uterine perforation or IUD expulsion.    Possibility of difficult removal.    Spotting or heavy bleeding.    Cramping, pain or infection during or after insertion.    The patient was given patient information on the IUD and the patient education brochure from the .  She understands the main indication for removal is abnormal bleeding.  The patient has given consent to proceed with placement of the IUD.  She wishes to proceed.  All questions answered.    PROCEDURE:    Type of IUD: Mirena   She is placed in a dorsal lithotomy potion and a pelvic exam is performed to determine the position of the uterus.  The cervix is identified and cleaned with betadine.  A single tooth tenaculum is applied to the anterior lip of the cervix for stabilization.   The uterus sounded to 8.0 cm. (Target sound depth is 6.5 cm to 8.5 cm.)  The IUD is inserted into the uterus under sterile conditions in the usual fashion.    Lot number [id number] and expiration date 12/19.  NDC#   28752-079-50 The IUD string is then cut to 4.0 cm.    The patient tolerated this procedure without immediate complication.  The patient is to return or call immediately for any unexplained fever, abdominal or pelvic pain, excessive bleeding, possibility of pregnancy, foul-smelling discharge, sense that the IUD has been expelled.  All questions were answered.  Patient is aware that the IUD will be effective for 5 years and then will need removal or replacement.  Barrier methods for the first week advised.    Return to clinic in 3 months for IUD check    Maggie Guzámn MD

## 2018-06-18 ENCOUNTER — OFFICE VISIT (OUTPATIENT)
Dept: OPTOMETRY | Facility: CLINIC | Age: 23
End: 2018-06-18
Payer: COMMERCIAL

## 2018-06-18 DIAGNOSIS — H52.13 MYOPIA OF BOTH EYES: Primary | ICD-10-CM

## 2018-06-18 PROCEDURE — 92015 DETERMINE REFRACTIVE STATE: CPT | Performed by: OPTOMETRIST

## 2018-06-18 PROCEDURE — 92014 COMPRE OPH EXAM EST PT 1/>: CPT | Performed by: OPTOMETRIST

## 2018-06-18 ASSESSMENT — VISUAL ACUITY
OD_CC: 20/20
OS_CC: 20/20
OD_SC: 20/50
OD_CC: 20/20
OS_CC: 20/20
CORRECTION_TYPE: GLASSES
METHOD: SNELLEN - LINEAR
OS_SC: 20/40

## 2018-06-18 ASSESSMENT — SLIT LAMP EXAM - LIDS
COMMENTS: NORMAL
COMMENTS: NORMAL

## 2018-06-18 ASSESSMENT — TONOMETRY
OD_IOP_MMHG: 10
OS_IOP_MMHG: 11
IOP_METHOD: TONOPEN

## 2018-06-18 ASSESSMENT — REFRACTION_WEARINGRX
SPECS_TYPE: SVL
OD_SPHERE: -1.00
OS_SPHERE: -1.00

## 2018-06-18 ASSESSMENT — EXTERNAL EXAM - RIGHT EYE: OD_EXAM: NORMAL

## 2018-06-18 ASSESSMENT — CUP TO DISC RATIO
OS_RATIO: 0.15
OD_RATIO: 0.15

## 2018-06-18 ASSESSMENT — REFRACTION_MANIFEST
OD_SPHERE: -1.00
OS_SPHERE: -1.00

## 2018-06-18 ASSESSMENT — EXTERNAL EXAM - LEFT EYE: OS_EXAM: NORMAL

## 2018-06-18 ASSESSMENT — CONF VISUAL FIELD
OS_NORMAL: 1
OD_NORMAL: 1

## 2018-06-18 NOTE — LETTER
6/18/2018         RE: Gayathri Llamas  [address] [facility] [address]        Dear Colleague,    Thank you for referring your patient, Gayathri Llamas, to the [facility]. Please see a copy of my visit note below.    Chief Complaint   Patient presents with     COMPREHENSIVE EYE EXAM         Last Eye Exam: 10/5/16  Dilated Previously: Yes    What are you currently using to see?  glasses        Distance Vision Acuity: Satisfied with vision    Near Vision Acuity: Satisfied with vision while reading  with glasses    Eye Comfort: good  Do you use eye drops? : No  Occupation or Hobbies:        Medical, surgical and family histories reviewed and updated 6/18/2018.       OBJECTIVE: See Ophthalmology exam    ASSESSMENT:    ICD-10-CM    1. Myopia of both eyes H52.13 EYE EXAM (SIMPLE-NONBILLABLE)     REFRACTION      PLAN:     Patient Instructions   Eyeglass prescription given.  No change in eyeglass prescription.    Recommend returning for dilated fundus exam. It is a more thorough exam of the retina.    Return in 1 year for a complete eye exam or sooner if needed.    Se Bain, CHRISTELLE           Again, thank you for allowing me to participate in the care of your patient.        Sincerely,        Se Bain, OD

## 2018-06-18 NOTE — PROGRESS NOTES
Chief Complaint   Patient presents with     COMPREHENSIVE EYE EXAM         Last Eye Exam: 10/5/16  Dilated Previously: Yes    What are you currently using to see?  glasses        Distance Vision Acuity: Satisfied with vision    Near Vision Acuity: Satisfied with vision while reading  with glasses    Eye Comfort: good  Do you use eye drops? : No  Occupation or Hobbies:        Medical, surgical and family histories reviewed and updated 6/18/2018.       OBJECTIVE: See Ophthalmology exam    ASSESSMENT:    ICD-10-CM    1. Myopia of both eyes H52.13 EYE EXAM (SIMPLE-NONBILLABLE)     REFRACTION      PLAN:     Patient Instructions   Eyeglass prescription given.  No change in eyeglass prescription.    Recommend returning for dilated fundus exam. It is a more thorough exam of the retina.    Return in 1 year for a complete eye exam or sooner if needed.    Se Bain, OD

## 2018-06-18 NOTE — MR AVS SNAPSHOT
After Visit Summary   6/18/2018    Gayathri Llamas    MRN: [id number]           Patient Information     Date Of Birth          1995        Visit Information        Provider Department      6/18/2018 6:20 PM Se Bain, OD [facility]        Today's Diagnoses     Myopia of both eyes    -  1      Care Instructions    Eyeglass prescription given.  No change in eyeglass prescription.    Recommend returning for dilated fundus exam. It is a more thorough exam of the retina.    Return in 1 year for a complete eye exam or sooner if needed.    Se Bain OD            Follow-ups after your visit        Follow-up notes from your care team     Return in about 1 year (around 6/18/2019) for Annual Visit.      Who to contact     If you have questions or need follow up information about today's clinic visit or your schedule please contact [facility] directly at [phone].  Normal or non-critical lab and imaging results will be communicated to you by MyChart, letter or phone within 4 business days after the clinic has received the results. If you do not hear from us within 7 days, please contact the clinic through [employer] or phone. If you have a critical or abnormal lab result, we will notify you by phone as soon as possible.  Submit refill requests through [employer] or call your pharmacy and they will forward the refill request to us. Please allow 3 business days for your refill to be completed.          Additional Information About Your Visit        MyChart Information     [employer] gives you secure access to your electronic health record. If you see a primary care provider, you can also send messages to your care team and make appointments. If you have questions, please call your primary care clinic.  If you do not have a primary care provider, please call [phone] and they will assist you.        Care EveryWhere ID     This is your Care EveryWhere ID. This could be  used by other organizations to access your [facility] medical records  [id number]         Blood Pressure from Last 3 Encounters:   05/11/17 110/90   11/17/16 113/62   10/04/16 120/60    Weight from Last 3 Encounters:   05/26/17 (!) 152.8 kg (336 lb 14.4 oz)   05/11/17 (!) 167.4 kg (369 lb)   11/17/16 (!) 167.4 kg (369 lb)              We Performed the Following     EYE EXAM (SIMPLE-NONBILLABLE)     REFRACTION        Primary Care Provider Office Phone # Fax #    Malissa Joan Diaz [phone]       [facility] [address]    [facility] [place] [address]        Equal Access to Services     [facility] : Hadii cherie aguayo hadasho Soomaali, waaxda luqadaha, qaybta kaalmada adeegyada, kelsey gasparin hayanselmo saravia . So [facility] [phone].    ATENCIÓN: Si habla español, tiene a suárez disposición servicios gratuitos de asistencia lingüística. Leticia al [phone].    We comply with applicable federal civil rights laws and [place] laws. We do not discriminate on the basis of race, color, national origin, age, disability, sex, sexual orientation, or gender identity.            Thank you!     Thank you for choosing [facility]  for your care. Our goal is always to provide you with excellent care. Hearing back from our patients is one way we can continue to improve our services. Please take a few minutes to complete the written survey that you may receive in the mail after your visit with us. Thank you!             Your Updated Medication List - Protect others around you: Learn how to safely use, store and throw away your medicines at www.disposemymeds.org.          This list is accurate as of 6/18/18  7:04 PM.  Always use your most recent med list.                   Brand Name Dispense Instructions for use Diagnosis    ADVIL 200 MG tablet   Generic drug:  ibuprofen      Take 400 mg by mouth every 4 hours as needed.        norethindrone-ethinyl estradiol  1-20 MG-MCG per tablet    MICROGESTIN 1/20    63 tablet    Take 1 tablet by mouth daily    Encounter for initial prescription of contraceptive pills

## 2018-06-19 NOTE — PATIENT INSTRUCTIONS
Eyeglass prescription given.  No change in eyeglass prescription.    Recommend returning for dilated fundus exam. It is a more thorough exam of the retina.    Return in 1 year for a complete eye exam or sooner if needed.    Se Bain, OD

## 2018-09-24 ENCOUNTER — ALLIED HEALTH/NURSE VISIT (OUTPATIENT)
Dept: NURSING | Facility: CLINIC | Age: 23
End: 2018-09-24
Payer: COMMERCIAL

## 2018-09-24 DIAGNOSIS — Z23 NEED FOR PROPHYLACTIC VACCINATION AND INOCULATION AGAINST INFLUENZA: Primary | ICD-10-CM

## 2018-09-24 PROCEDURE — 90686 IIV4 VACC NO PRSV 0.5 ML IM: CPT

## 2018-09-24 PROCEDURE — 99207 ZZC NO CHARGE NURSE ONLY: CPT

## 2018-09-24 PROCEDURE — 90471 IMMUNIZATION ADMIN: CPT

## 2018-09-24 NOTE — MR AVS SNAPSHOT
After Visit Summary   9/24/2018    Gayathri Llamas    MRN: [id number]           Patient Information     Date Of Birth          1995        Visit Information        Provider Department      9/24/2018 2:45 PM CARE COORDINATOR [facility]        Today's Diagnoses     Need for prophylactic vaccination and inoculation against influenza    -  1       Follow-ups after your visit        Who to contact     If you have questions or need follow up information about today's clinic visit or your schedule please contact [facility] directly at [phone].  Normal or non-critical lab and imaging results will be communicated to you by brands[employer]friend[employer]art, letter or phone within 4 business days after the clinic has received the results. If you do not hear from us within 7 days, please contact the clinic through [employer] or phone. If you have a critical or abnormal lab result, we will notify you by phone as soon as possible.  Submit refill requests through [employer] or call your pharmacy and they will forward the refill request to us. Please allow 3 business days for your refill to be completed.          Additional Information About Your Visit        MyChart Information     [employer] gives you secure access to your electronic health record. If you see a primary care provider, you can also send messages to your care team and make appointments. If you have questions, please call your primary care clinic.  If you do not have a primary care provider, please call [phone] and they will assist you.        Care EveryWhere ID     This is your Care EveryWhere ID. This could be used by other organizations to access your [facility] medical records  [id number]         Blood Pressure from Last 3 Encounters:   05/11/17 110/90   11/17/16 113/62   10/04/16 120/60    Weight from Last 3 Encounters:   05/26/17 (!) 336 lb 14.4 oz (152.8 kg)   05/11/17 (!) 369 lb (167.4 kg)   11/17/16 (!) 369 lb (167.4  kg)              We Performed the Following     FLU VACCINE, SPLIT VIRUS, IM (QUADRIVALENT) [95755]- >3 YRS     Vaccine Administration, Initial [97007]        Primary Care Provider Office Phone # Fax #    Malissa Joan Diaz [phone]       [facility] [address]    [facility] [place] [address]        Equal Access to Services     [facility]: Hadii aad ku hadasho Soomaali, waaxda luqadaha, qaybta kaalmada adeegyada, waxay idiin hayaan adeeg kharash la'aan . So [facility] [phone].    ATENCIÓN: Si carlosla jo, tiene a suárez disposición servicios gratuitos de asistencia lingüística. Leticia al [phone].    We comply with applicable federal civil rights laws and [place] laws. We do not discriminate on the basis of race, color, national origin, age, disability, sex, sexual orientation, or gender identity.            Thank you!     Thank you for choosing [facility]  for your care. Our goal is always to provide you with excellent care. Hearing back from our patients is one way we can continue to improve our services. Please take a few minutes to complete the written survey that you may receive in the mail after your visit with us. Thank you!             Your Updated Medication List - Protect others around you: Learn how to safely use, store and throw away your medicines at www.disposemymeds.org.          This list is accurate as of 9/24/18  2:53 PM.  Always use your most recent med list.                   Brand Name Dispense Instructions for use Diagnosis    ADVIL 200 MG tablet   Generic drug:  ibuprofen      Take 400 mg by mouth every 4 hours as needed.        norethindrone-ethinyl estradiol 1-20 MG-MCG per tablet    MICROGESTIN 1/20    63 tablet    Take 1 tablet by mouth daily    Encounter for initial prescription of contraceptive pills

## 2018-09-24 NOTE — PROGRESS NOTES

## 2019-11-06 ENCOUNTER — HEALTH MAINTENANCE LETTER (OUTPATIENT)
Age: 24
End: 2019-11-06

## 2019-12-02 ENCOUNTER — OFFICE VISIT (OUTPATIENT)
Dept: FAMILY MEDICINE | Facility: CLINIC | Age: 24
End: 2019-12-02
Payer: COMMERCIAL

## 2019-12-02 VITALS
SYSTOLIC BLOOD PRESSURE: 126 MMHG | HEIGHT: 70 IN | WEIGHT: 293 LBS | BODY MASS INDEX: 41.95 KG/M2 | TEMPERATURE: 98 F | HEART RATE: 58 BPM | OXYGEN SATURATION: 98 % | DIASTOLIC BLOOD PRESSURE: 84 MMHG

## 2019-12-02 DIAGNOSIS — Z00.00 ROUTINE GENERAL MEDICAL EXAMINATION AT A HEALTH CARE FACILITY: Primary | ICD-10-CM

## 2019-12-02 DIAGNOSIS — E66.01 MORBID OBESITY (H): ICD-10-CM

## 2019-12-02 DIAGNOSIS — Z12.4 SCREENING FOR MALIGNANT NEOPLASM OF CERVIX: ICD-10-CM

## 2019-12-02 DIAGNOSIS — Z80.41 FAMILY HISTORY OF MALIGNANT NEOPLASM OF OVARY: ICD-10-CM

## 2019-12-02 DIAGNOSIS — E55.9 VITAMIN D DEFICIENCY: ICD-10-CM

## 2019-12-02 DIAGNOSIS — Z11.3 SCREEN FOR STD (SEXUALLY TRANSMITTED DISEASE): ICD-10-CM

## 2019-12-02 DIAGNOSIS — Z23 NEED FOR PROPHYLACTIC VACCINATION AND INOCULATION AGAINST INFLUENZA: ICD-10-CM

## 2019-12-02 LAB — CANCER AG125 SERPL-ACNC: 7 U/ML (ref 0–30)

## 2019-12-02 PROCEDURE — 86304 IMMUNOASSAY TUMOR CA 125: CPT | Performed by: NURSE PRACTITIONER

## 2019-12-02 PROCEDURE — 90686 IIV4 VACC NO PRSV 0.5 ML IM: CPT | Performed by: NURSE PRACTITIONER

## 2019-12-02 PROCEDURE — 87491 CHLMYD TRACH DNA AMP PROBE: CPT | Performed by: NURSE PRACTITIONER

## 2019-12-02 PROCEDURE — 90715 TDAP VACCINE 7 YRS/> IM: CPT | Performed by: NURSE PRACTITIONER

## 2019-12-02 PROCEDURE — 99395 PREV VISIT EST AGE 18-39: CPT | Mod: 25 | Performed by: NURSE PRACTITIONER

## 2019-12-02 PROCEDURE — 87591 N.GONORRHOEAE DNA AMP PROB: CPT | Performed by: NURSE PRACTITIONER

## 2019-12-02 PROCEDURE — 36415 COLL VENOUS BLD VENIPUNCTURE: CPT | Performed by: NURSE PRACTITIONER

## 2019-12-02 PROCEDURE — 82306 VITAMIN D 25 HYDROXY: CPT | Performed by: NURSE PRACTITIONER

## 2019-12-02 PROCEDURE — G0145 SCR C/V CYTO,THINLAYER,RESCR: HCPCS | Performed by: NURSE PRACTITIONER

## 2019-12-02 PROCEDURE — 90472 IMMUNIZATION ADMIN EACH ADD: CPT | Performed by: NURSE PRACTITIONER

## 2019-12-02 PROCEDURE — 90471 IMMUNIZATION ADMIN: CPT | Performed by: NURSE PRACTITIONER

## 2019-12-02 ASSESSMENT — ENCOUNTER SYMPTOMS
HEARTBURN: 0
PALPITATIONS: 0
ABDOMINAL PAIN: 1
COUGH: 0
JOINT SWELLING: 0
CONSTIPATION: 0
HEMATOCHEZIA: 0
PARESTHESIAS: 0
DYSURIA: 0
NERVOUS/ANXIOUS: 0
FEVER: 0
ARTHRALGIAS: 0
BREAST MASS: 0
NAUSEA: 0
EYE PAIN: 0
MYALGIAS: 0
HEADACHES: 0
FREQUENCY: 0
HEMATURIA: 0
DIZZINESS: 0
DIARRHEA: 0
WEAKNESS: 0
SORE THROAT: 0
CHILLS: 0
SHORTNESS OF BREATH: 0

## 2019-12-02 ASSESSMENT — MIFFLIN-ST. JEOR: SCORE: 2522.63

## 2019-12-02 NOTE — PROGRESS NOTES
SUBJECTIVE:   CC: Gayathri Llamas is an 24 year old woman who presents for preventive health visit.     Healthy Habits:     Getting at least 3 servings of Calcium per day:  Yes    Bi-annual eye exam:  Yes    Dental care twice a year:  NO    Sleep apnea or symptoms of sleep apnea:  None    Diet:  Regular (no restrictions)    Frequency of exercise:  None    Taking medications regularly:  Yes    Medication side effects:  Not applicable    PHQ-2 Total Score: 1    Additional concerns today:  No      -extensive maternal family history of ovarian cancer; mom had prophylactic GIRISH. Patient thinks Mom had genetic testing and was negative  -mild cramping and bleeding around time of a period & has mirena- wonders if this is normal    Today's PHQ-2 Score:   PHQ-2 ( 1999 Pfizer) 12/2/2019   Q1: Little interest or pleasure in doing things 0   Q2: Feeling down, depressed or hopeless 1   PHQ-2 Score 1   Q1: Little interest or pleasure in doing things Not at all   Q2: Feeling down, depressed or hopeless Several days   PHQ-2 Score 1       Abuse: Current or Past(Physical, Sexual or Emotional)- No  Do you feel safe in your environment? Yes        Social History     Tobacco Use     Smoking status: Never Smoker     Smokeless tobacco: Never Used   Substance Use Topics     Alcohol use: Yes     If you drink alcohol do you typically have >3 drinks per day or >7 drinks per week? No    Alcohol Use 12/2/2019   Prescreen: >3 drinks/day or >7 drinks/week? No   Prescreen: >3 drinks/day or >7 drinks/week? -       Reviewed orders with patient.  Reviewed health maintenance and updated orders accordingly - Yes  Labs reviewed in EPIC    Mammogram not appropriate for this patient based on age.    Pertinent mammograms are reviewed under the imaging tab.  History of abnormal Pap smear: NO - age 21-29 PAP every 3 years recommended  PAP / HPV 5/11/2017   PAP NIL     Reviewed and updated as needed this visit by clinical staff  Tobacco  Allergies  Meds   "       Reviewed and updated as needed this visit by Provider            Review of Systems   Constitutional: Negative for chills and fever.   HENT: Positive for congestion. Negative for ear pain, hearing loss and sore throat.    Eyes: Negative for pain and visual disturbance.   Respiratory: Negative for cough and shortness of breath.    Cardiovascular: Negative for chest pain, palpitations and peripheral edema.   Gastrointestinal: Positive for abdominal pain. Negative for constipation, diarrhea, heartburn, hematochezia and nausea.   Breasts:  Negative for tenderness, breast mass and discharge.   Genitourinary: Positive for pelvic pain. Negative for dysuria, frequency, genital sores, hematuria, urgency, vaginal bleeding and vaginal discharge.   Musculoskeletal: Negative for arthralgias, joint swelling and myalgias.   Skin: Negative for rash.   Neurological: Negative for dizziness, weakness, headaches and paresthesias.   Psychiatric/Behavioral: Negative for mood changes. The patient is not nervous/anxious.           OBJECTIVE:   /84 (BP Location: Left arm, Patient Position: Chair, Cuff Size: Adult Large)   Pulse 58   Temp 98  F (36.7  C) (Oral)   Ht 1.778 m (5' 10\")   Wt (!) 168.7 kg (372 lb)   SpO2 98%   BMI 53.38 kg/m    Physical Exam  GENERAL: healthy, alert and no distress  EYES: Eyes grossly normal to inspection, PERRL and conjunctivae and sclerae normal  HENT: ear canals and TM's normal, nose and mouth without ulcers or lesions  NECK: no adenopathy, no asymmetry, masses, or scars and thyroid normal to palpation  RESP: lungs clear to auscultation - no rales, rhonchi or wheezes  BREAST: normal without masses, tenderness or nipple discharge and no palpable axillary masses or adenopathy  CV: regular rate and rhythm, normal S1 S2, no S3 or S4, no murmur, click or rub, no peripheral edema and peripheral pulses strong  ABDOMEN: soft, nontender, no hepatosplenomegaly, no masses and bowel sounds normal   " "(female): normal female external genitalia, normal urethral meatus, vaginal mucosa pink, moist, well rugated, and normal cervix/adnexa/uterus without masses or discharge; IUD strings visualized at os  MS: no gross musculoskeletal defects noted, no edema  SKIN: no suspicious lesions or rashes  NEURO: Normal strength and tone, mentation intact and speech normal  PSYCH: mentation appears normal, affect normal/bright; tearful when discussing weight    Diagnostic Test Results:  Labs reviewed in Epic  No results found for this or any previous visit (from the past 24 hour(s)).    ASSESSMENT/PLAN:   1. Routine general medical examination at a health care facility      2. Screening for malignant neoplasm of cervix    - Pap imaged thin layer screen only - recommended age 21 - 24 years    3. Morbid obesity (H)  Diet/exercise counseling. Declines referral to [facility] or Dr. Brennan    4. Screen for STD (sexually transmitted disease)    - NEISSERIA GONORRHOEA PCR  - CHLAMYDIA TRACHOMATIS PCR    5. Family history of malignant neoplasm of ovary  Patient requests screening. Offered referral to genetic- she will confirm with Mom first if she did genetic testing.  -     6. Vitamin D deficiency    - Vitamin D Deficiency    7. Need for prophylactic vaccination and inoculation against influenza    - INFLUENZA VACCINE IM > 6 MONTHS VALENT IIV4 [60197]  - Vaccine Administration, Initial [06526]    COUNSELING:  Reviewed preventive health counseling, as reflected in patient instructions       Regular exercise       Healthy diet/nutrition       Immunizations    Vaccinated for: Influenza and TDAP             Contraception    Estimated body mass index is 53.38 kg/m  as calculated from the following:    Height as of this encounter: 1.778 m (5' 10\").    Weight as of this encounter: 168.7 kg (372 lb).    Weight management plan: Discussed healthy diet and exercise guidelines     reports that she has never smoked. She has never used smokeless " tobacco.      Counseling Resources:  ATP IV Guidelines  Pooled Cohorts Equation Calculator  Breast Cancer Risk Calculator  FRAX Risk Assessment  ICSI Preventive Guidelines  Dietary Guidelines for Americans, 2010  USDA's MyPlate  ASA Prophylaxis  Lung CA Screening    EDUARDA Doan CNP  [facility]

## 2019-12-03 LAB
C TRACH DNA SPEC QL NAA+PROBE: NEGATIVE
DEPRECATED CALCIDIOL+CALCIFEROL SERPL-MC: 8 UG/L (ref 20–75)
N GONORRHOEA DNA SPEC QL NAA+PROBE: NEGATIVE
SPECIMEN SOURCE: NORMAL
SPECIMEN SOURCE: NORMAL

## 2019-12-04 LAB
COPATH REPORT: NORMAL
PAP: NORMAL

## 2019-12-04 RX ORDER — ERGOCALCIFEROL 1.25 MG/1
50000 CAPSULE, LIQUID FILLED ORAL WEEKLY
Qty: 8 CAPSULE | Refills: 0 | Status: SHIPPED | OUTPATIENT
Start: 2019-12-04 | End: 2020-01-23

## 2019-12-17 DIAGNOSIS — J01.41 ACUTE RECURRENT PANSINUSITIS: Primary | ICD-10-CM

## 2019-12-17 RX ORDER — PREDNISONE 10 MG/1
10 TABLET ORAL 2 TIMES DAILY
Qty: 6 TABLET | Refills: 2 | Status: SHIPPED | OUTPATIENT
Start: 2019-12-17 | End: 2019-12-20

## 2020-11-29 ENCOUNTER — HEALTH MAINTENANCE LETTER (OUTPATIENT)
Age: 25
End: 2020-11-29

## 2020-12-10 ENCOUNTER — OFFICE VISIT (OUTPATIENT)
Dept: OPTOMETRY | Facility: CLINIC | Age: 25
End: 2020-12-10
Payer: COMMERCIAL

## 2020-12-10 DIAGNOSIS — Z01.00 EXAMINATION OF EYES AND VISION: Primary | ICD-10-CM

## 2020-12-10 DIAGNOSIS — H52.13 MYOPIA OF BOTH EYES: ICD-10-CM

## 2020-12-10 PROCEDURE — 92014 COMPRE OPH EXAM EST PT 1/>: CPT | Performed by: OPTOMETRIST

## 2020-12-10 ASSESSMENT — REFRACTION_WEARINGRX
SPECS_TYPE: SVL
OS_SPHERE: -1.00
OD_SPHERE: -1.00

## 2020-12-10 ASSESSMENT — EXTERNAL EXAM - LEFT EYE: OS_EXAM: NORMAL

## 2020-12-10 ASSESSMENT — SLIT LAMP EXAM - LIDS
COMMENTS: NORMAL
COMMENTS: NORMAL

## 2020-12-10 ASSESSMENT — TONOMETRY
OD_IOP_MMHG: 22
IOP_METHOD: TONOPEN
OS_IOP_MMHG: 22

## 2020-12-10 ASSESSMENT — EXTERNAL EXAM - RIGHT EYE: OD_EXAM: NORMAL

## 2020-12-10 ASSESSMENT — VISUAL ACUITY
OD_CC: 20/20
OD_CC+: -1
CORRECTION_TYPE: GLASSES
OS_SC: 20/50
OD_SC+: -1
OD_CC: 20/20
OS_CC: 20/20
OS_CC: 20/20
METHOD: SNELLEN - LINEAR
OD_SC: 20/40

## 2020-12-10 ASSESSMENT — CONF VISUAL FIELD
OD_NORMAL: 1
OS_NORMAL: 1

## 2020-12-10 ASSESSMENT — CUP TO DISC RATIO
OD_RATIO: 0.15
OS_RATIO: 0.15

## 2020-12-10 ASSESSMENT — REFRACTION_MANIFEST
OD_SPHERE: -1.00
OS_SPHERE: -1.00

## 2020-12-10 NOTE — PATIENT INSTRUCTIONS
Eyeglass prescription given.    Return in 1 year for a complete eye exam or sooner if needed.    Se Bain OD      The affects of the dilating drops last for 4- 6 hours.  You will be more sensitive to light and vision will be blurry up close.  Do not drive if you do not feel comfortable.  Mydriatic sunglasses were given if needed.      Optometry Providers       Clinic Locations                                 Telephone Number   Dr. Tiffanie Reynaga   [place] [phone]     [place] [facility] Hours:                [place] [facility] Hours:       [place] [facility] Hours:   [address]   [address] [address]     [address]  [place] [address]   [place] [address]    [place] [address]  Phone: [phone]                    Phone: [phone]     Phone: [phone]                      Monday 8:00-7:00                          Monday 8:00-7:00                          Monday 8:00-7:00              Tuesday 8:00-6:00                          Tuesday 8:00-7:00                          Tuesday 8:00-7:00              Wednesday 8:00-6:00                  Wednesday 8:00-7:00                   Wednesday 8:00-7:00      Thursday 8:00-6:00                        Thursday 8:00-7:00                         Thursday 8:00-7:00            Friday 8:00-5:00                              Friday 8:00-5:00                              Friday 8:00-5:00    [place] [facility] Hours:   [address] [place] [address]  [phone]    Monday 8:00-7:00  Tuesday 8:00-7:00  Wednesday 8:00-7:00  Thursday 8:00-7:00  Friday 8:00-5:00  Please log on to [email or web address] to order your contact lenses.  The link is found on the [facility] page.  As always, Thank you for trusting us with your health care needs!

## 2020-12-10 NOTE — PROGRESS NOTES
Chief Complaint   Patient presents with     COMPREHENSIVE EYE EXAM         Last Eye Exam: June 2018  Dilated Previously: Yes    What are you currently using to see?  glasses       Distance Vision Acuity: Satisfied with vision    Near Vision Acuity: Satisfied with vision while reading with glasses    Eye Comfort: good  Do you use eye drops? : No  Occupation or Hobbies:     [employer]         Medical, surgical and family histories reviewed and updated 12/10/2020.       OBJECTIVE: See Ophthalmology exam    ASSESSMENT:    ICD-10-CM    1. Examination of eyes and vision  Z01.00    2. Myopia of both eyes  H52.13       PLAN:     Patient Instructions   Eyeglass prescription given.    Return in 1 year for a complete eye exam or sooner if needed.    Se Bain, OD

## 2020-12-10 NOTE — LETTER
12/10/2020         RE: Gayathri Llamas  [address] [address]        Dear Colleague,    Thank you for referring your patient, Gayathri Llamas, to the [facility]. Please see a copy of my visit note below.    Chief Complaint   Patient presents with     COMPREHENSIVE EYE EXAM         Last Eye Exam: June 2018  Dilated Previously: Yes    What are you currently using to see?  glasses       Distance Vision Acuity: Satisfied with vision    Near Vision Acuity: Satisfied with vision while reading with glasses    Eye Comfort: good  Do you use eye drops? : No  Occupation or Hobbies:     [employer]         Medical, surgical and family histories reviewed and updated 12/10/2020.       OBJECTIVE: See Ophthalmology exam    ASSESSMENT:    ICD-10-CM    1. Examination of eyes and vision  Z01.00    2. Myopia of both eyes  H52.13       PLAN:     Patient Instructions   Eyeglass prescription given.    Return in 1 year for a complete eye exam or sooner if needed.    Se Bain, CHRISTELLE             Again, thank you for allowing me to participate in the care of your patient.        Sincerely,        Se Bain, OD

## 2021-01-15 ENCOUNTER — HEALTH MAINTENANCE LETTER (OUTPATIENT)
Age: 26
End: 2021-01-15

## 2021-09-19 ENCOUNTER — HEALTH MAINTENANCE LETTER (OUTPATIENT)
Age: 26
End: 2021-09-19

## 2022-02-14 ENCOUNTER — OFFICE VISIT (OUTPATIENT)
Dept: OPTOMETRY | Facility: CLINIC | Age: 27
End: 2022-02-14
Payer: COMMERCIAL

## 2022-02-14 DIAGNOSIS — Z01.00 EXAMINATION OF EYES AND VISION: Primary | ICD-10-CM

## 2022-02-14 DIAGNOSIS — H52.13 MYOPIA OF BOTH EYES: ICD-10-CM

## 2022-02-14 PROCEDURE — 92014 COMPRE OPH EXAM EST PT 1/>: CPT | Performed by: OPTOMETRIST

## 2022-02-14 ASSESSMENT — VISUAL ACUITY
OD_SC: 20/20
METHOD: SNELLEN - LINEAR
OS_CC+: -2
OD_SC: 20/30
OD_CC: 20/15
CORRECTION_TYPE: GLASSES
OS_SC: 20/20
OS_SC: 20/40
OS_CC: 20/15

## 2022-02-14 ASSESSMENT — CONF VISUAL FIELD
OS_NORMAL: 1
OD_NORMAL: 1
METHOD: COUNTING FINGERS

## 2022-02-14 ASSESSMENT — REFRACTION_MANIFEST
OS_SPHERE: -1.00
OD_SPHERE: -1.00

## 2022-02-14 ASSESSMENT — EXTERNAL EXAM - LEFT EYE: OS_EXAM: NORMAL

## 2022-02-14 ASSESSMENT — TONOMETRY
OD_IOP_MMHG: 13
IOP_METHOD: TONOPEN
OS_IOP_MMHG: 15

## 2022-02-14 ASSESSMENT — CUP TO DISC RATIO
OS_RATIO: 0.15
OD_RATIO: 0.15

## 2022-02-14 ASSESSMENT — REFRACTION_WEARINGRX
OS_SPHERE: -1.00
OD_SPHERE: -1.00

## 2022-02-14 ASSESSMENT — EXTERNAL EXAM - RIGHT EYE: OD_EXAM: NORMAL

## 2022-02-14 ASSESSMENT — SLIT LAMP EXAM - LIDS
COMMENTS: NORMAL
COMMENTS: NORMAL

## 2022-02-14 NOTE — PROGRESS NOTES
Chief Complaint   Patient presents with     Annual Eye Exam         Last Eye Exam: 12/10/2020  Dilated Previously: Yes    What are you currently using to see?  glasses       Distance Vision Acuity: Satisfied with vision    Near Vision Acuity: Satisfied with vision while reading  with glasses    Eye Comfort: good  Do you use eye drops? : No  Occupation or Hobbies:        Medical, surgical and family histories reviewed and updated 2/14/2022.       OBJECTIVE: See Ophthalmology exam    ASSESSMENT:    ICD-10-CM    1. Examination of eyes and vision  Z01.00    2. Myopia of both eyes  H52.13       PLAN:     Patient Instructions   Eyeglass prescription given.  No change in eyeglass prescription.    Return in 1 year for a complete eye exam or sooner if needed.    Se Bain, OD

## 2022-02-14 NOTE — PATIENT INSTRUCTIONS
Eyeglass prescription given.  No change in eyeglass prescription.    Return in 1 year for a complete eye exam or sooner if needed.    Se Bain, CHRISTELLE    The affects of the dilating drops last for 4- 6 hours.  You will be more sensitive to light and vision will be blurry up close.  Do not drive if you do not feel comfortable.  Mydriatic sunglasses were given if needed.      Optometry Providers       Clinic Locations                                 Telephone Number   Dr. Tiffanie Singh    [place]   [facility]  [place] [phone]     [place] [facility] Hours:                [place] [facility] Hours:       [place] [facility] Hours:   [address]   [address] [address]      [address] [place] [address]   [place] [address]    [place] [address]  Phone: [phone]                    Phone: [phone]     Phone: [phone]                      Monday 8:00-6:00                          Monday 8:00-6:00                          Monday 8:00-6:00              Tuesday 8:00-6:00                          Tuesday 8:00-6:00                          Tuesday 8:00-6:00              Wednesday 8:00-6:00                  Wednesday 8:00-6:00                   Wednesday 8:00-6:00      Thursday 8:00-6:00                        Thursday 8:00-6:00                         Thursday 8:00-6:00            Friday 8:00-5:00                              Friday 8:00-5:00                              Friday 8:00-5:00    [place] [facility] Hours:   [address] [place] [address]  [phone]    Monday 9:00-6:00  Tuesday 9:00-6:00  Wednesday 9:00-6:00  Thursday 9:00-6:00  Friday 9:00-5:00  Please log on to [email or web address] to order your contact lenses.  The link is found on the [facility] page.  As always, Thank you for trusting us with your health care needs!

## 2022-02-14 NOTE — LETTER
2/14/2022         RE: Gayathri Llamas  [address] [address]        Dear Colleague,    Thank you for referring your patient, Gayathri Ll[place]a, to the [facility]. Please see a copy of my visit note below.    Chief Complaint   Patient presents with     Annual Eye Exam         Last Eye Exam: 12/10/2020  Dilated Previously: Yes    What are you currently using to see?  glasses       Distance Vision Acuity: Satisfied with vision    Near Vision Acuity: Satisfied with vision while reading  with glasses    Eye Comfort: good  Do you use eye drops? : No  Occupation or Hobbies:        Medical, surgical and family histories reviewed and updated 2/14/2022.       OBJECTIVE: See Ophthalmology exam    ASSESSMENT:    ICD-10-CM    1. Examination of eyes and vision  Z01.00    2. Myopia of both eyes  H52.13       PLAN:     Patient Instructions   Eyeglass prescription given.  No change in eyeglass prescription.    Return in 1 year for a complete eye exam or sooner if needed.    Se Bain, OD             Again, thank you for allowing me to participate in the care of your patient.        Sincerely,        Se Bain, OD

## 2022-03-06 ENCOUNTER — HEALTH MAINTENANCE LETTER (OUTPATIENT)
Age: 27
End: 2022-03-06

## 2022-11-21 ENCOUNTER — HEALTH MAINTENANCE LETTER (OUTPATIENT)
Age: 27
End: 2022-11-21

## 2023-04-16 ENCOUNTER — HEALTH MAINTENANCE LETTER (OUTPATIENT)
Age: 28
End: 2023-04-16

## 2024-06-23 ENCOUNTER — HEALTH MAINTENANCE LETTER (OUTPATIENT)
Age: 29
End: 2024-06-23

## 2025-02-17 ENCOUNTER — OFFICE VISIT (OUTPATIENT)
Dept: URGENT CARE | Facility: URGENT CARE | Age: 30
End: 2025-02-17
Payer: COMMERCIAL

## 2025-02-17 VITALS
WEIGHT: 293 LBS | BODY MASS INDEX: 55.96 KG/M2 | SYSTOLIC BLOOD PRESSURE: 142 MMHG | OXYGEN SATURATION: 97 % | HEART RATE: 88 BPM | DIASTOLIC BLOOD PRESSURE: 90 MMHG | TEMPERATURE: 97 F

## 2025-02-17 DIAGNOSIS — H65.92 OME (OTITIS MEDIA WITH EFFUSION), LEFT: Primary | ICD-10-CM

## 2025-02-17 PROCEDURE — 99203 OFFICE O/P NEW LOW 30 MIN: CPT | Performed by: PHYSICIAN ASSISTANT

## 2025-02-17 RX ORDER — AMOXICILLIN 875 MG/1
875 TABLET, COATED ORAL 2 TIMES DAILY
Qty: 14 TABLET | Refills: 0 | Status: SHIPPED | OUTPATIENT
Start: 2025-02-17 | End: 2025-02-24

## 2025-02-17 ASSESSMENT — ENCOUNTER SYMPTOMS
FEVER: 0
RHINORRHEA: 1
COUGH: 1

## 2025-02-17 NOTE — PROGRESS NOTES
SUBJECTIVE:   Gayathri Llamas is a 29 year old female presenting with a chief complaint of   Chief Complaint   Patient presents with    Ear Problem     Has had sinus problems recently, left ear pain     Nasal Congestion     Dayquil yesterday        She is a new patient of [facility].  6 days of symptoms.  Left ear pain.  No hx of tubes.      Treatment: dayquil.      Review of Systems   Constitutional:  Negative for fever.   HENT:  Positive for congestion, ear pain, postnasal drip and rhinorrhea.    Respiratory:  Positive for cough.    All other systems reviewed and are negative.      Past Medical History:   Diagnosis Date    Constipation     Menorrhagia     Tx-ORTHOTRICYCLIN     Family History   Problem Relation Age of Onset    Diabetes Father     Cancer Maternal Aunt         Ovarian Cancer - earliest at age 33    Ovarian Cancer Maternal Grandmother     Cancer Brother      Current Outpatient Medications   Medication Sig Dispense Refill    amoxicillin (AMOXIL) 875 MG tablet Take 1 tablet (875 mg) by mouth 2 times daily for 7 days. 14 tablet 0    ibuprofen (ADVIL) 200 MG tablet Take 400 mg by mouth every 4 hours as needed.      levonorgestrel (MIRENA) 20 MCG/24HR IUD 1 each by Intrauterine route once (Patient not taking: Reported on 2/17/2025)       Social History     Tobacco Use    Smoking status: Never    Smokeless tobacco: Never   Substance Use Topics    Alcohol use: Yes       OBJECTIVE  BP (!) 142/90 (BP Location: Right arm, Patient Position: Sitting, Cuff Size: Adult Regular)   Pulse 88   Temp 97  F (36.1  C) (Tympanic)   Wt (!) 176.9 kg (390 lb)   LMP 11/25/2024 (Exact Date)   SpO2 97%   BMI 55.96 kg/m      Physical Exam  Vitals and nursing note reviewed.   Constitutional:       Appearance: Normal appearance. She is obese.   HENT:      Head: Normocephalic and atraumatic.      Right Ear: Tympanic membrane, ear canal and external ear normal.      Left Ear: Ear canal and external ear normal.      Ears:       Comments: Left TM erythematous and white fluid behind.     Nose: Nose normal.      Mouth/Throat:      Mouth: Mucous membranes are moist.      Pharynx: Oropharynx is clear.   Eyes:      Extraocular Movements: Extraocular movements intact.      Conjunctiva/sclera: Conjunctivae normal.   Cardiovascular:      Rate and Rhythm: Normal rate and regular rhythm.      Pulses: Normal pulses.      Heart sounds: Normal heart sounds.   Pulmonary:      Effort: Pulmonary effort is normal.      Breath sounds: Normal breath sounds.   Musculoskeletal:      Cervical back: Normal range of motion.   Skin:     General: Skin is warm and dry.      Findings: No rash.   Neurological:      General: No focal deficit present.      Mental Status: She is alert.   Psychiatric:         Mood and Affect: Mood normal.         Behavior: Behavior normal.         Labs:  No results found for this or any previous visit (from the past 24 hours).    ASSESSMENT:      ICD-10-CM    1. OME (otitis media with effusion), left  H65.92 amoxicillin (AMOXIL) 875 MG tablet           Medical Decision Making:    Differential Diagnosis:  URI Adult/Peds:  Acute left otitis media and Otitis externa    Serious Comorbid Conditions:  Adult:   reviewed    PLAN:    Rx for amoxicillin.  Discussed reasons to seek immediate medical attention.  Additionally if no improvement or worsening in one week, may follow up with PCP and/or UC.        Followup:    If not improving or if condition worsens, follow up with your Primary Care Provider, If not improving or if conditions worsens over the next 12-24 hours, go to the Emergency Department    There are no Patient Instructions on file for this visit.

## 2025-02-24 ENCOUNTER — OFFICE VISIT (OUTPATIENT)
Dept: URGENT CARE | Facility: URGENT CARE | Age: 30
End: 2025-02-24
Payer: COMMERCIAL

## 2025-02-24 VITALS
BODY MASS INDEX: 41.02 KG/M2 | OXYGEN SATURATION: 98 % | HEIGHT: 71 IN | DIASTOLIC BLOOD PRESSURE: 84 MMHG | WEIGHT: 293 LBS | SYSTOLIC BLOOD PRESSURE: 132 MMHG | HEART RATE: 78 BPM | TEMPERATURE: 97.6 F | RESPIRATION RATE: 18 BRPM

## 2025-02-24 DIAGNOSIS — H66.002 NON-RECURRENT ACUTE SUPPURATIVE OTITIS MEDIA OF LEFT EAR WITHOUT SPONTANEOUS RUPTURE OF TYMPANIC MEMBRANE: Primary | ICD-10-CM

## 2025-02-24 PROCEDURE — 99203 OFFICE O/P NEW LOW 30 MIN: CPT | Performed by: PHYSICIAN ASSISTANT

## 2025-02-24 RX ORDER — CEFDINIR 300 MG/1
300 CAPSULE ORAL 2 TIMES DAILY
Qty: 20 CAPSULE | Refills: 0 | Status: SHIPPED | OUTPATIENT
Start: 2025-02-24 | End: 2025-03-06

## 2025-02-24 RX ORDER — PREDNISONE 20 MG/1
20 TABLET ORAL 2 TIMES DAILY
Qty: 10 TABLET | Refills: 0 | Status: SHIPPED | OUTPATIENT
Start: 2025-02-24 | End: 2025-03-01

## 2025-02-24 NOTE — PROGRESS NOTES
Non-recurrent acute suppurative otitis media of left ear without spontaneous rupture of tympanic membrane  - cefdinir (OMNICEF) 300 MG capsule; Take 1 capsule (300 mg) by mouth 2 times daily for 10 days.  - predniSONE (DELTASONE) 20 MG tablet; Take 1 tablet (20 mg) by mouth 2 times daily for 5 days.    General Tips for All Ages:    Rest:  Why: Allows your body to focus on recovery.  What to Do:  Take it easy and get plenty of rest.    Hydration:  Why: Helps support your immune system.  What to Do:  Drink plenty of fluids, including water and herbal teas.    OTC Pain Relievers (Acetaminophen or Ibuprofen):  Why: Manages pain and reduces fever.  What to Do:  Take over-the-counter pain relievers as directed.    For Children (Under 12 Years):    OTC Pediatric Pain Relievers (Acetaminophen or Ibuprofen):  Why: Controls pain and reduces fever.  What to Do:  Administer pediatric pain relievers as recommended by your child's pediatrician.    Warm Compress:  Why: Eases ear pain.  What to Do:  Apply a warm compress to the affected ear for 15-20 minutes.    For Adolescents (12-17 Years):    OTC Pain Relievers (Acetaminophen or Ibuprofen):  Why: Manages pain and reduces fever.  What to Do:  Take over-the-counter pain relievers as directed.    Ear Drops (if recommended by healthcare provider):  Why: Provides relief from ear discomfort.  What to Do:  Use ear drops as prescribed by your healthcare provider.    For Adults (18 Years and Older):    OTC Pain Relievers (Acetaminophen or Ibuprofen):  Why: Manages pain and reduces fever.  What to Do:  Take over-the-counter pain relievers as directed.    Ear Drops (if recommended by healthcare provider):  Why: Provides relief from ear discomfort.  What to Do:  Use ear drops as prescribed by your healthcare provider.    All Ages:    Antibiotics (if prescribed):  Why: Treats bacterial infections.  What to Do:  Take prescribed antibiotics as directed by your healthcare provider.    Keep  Ears Dry:  Why: Prevents further irritation.  What to Do:  Avoid swimming or getting water in the ears during treatment.    Avoid Smoke and Irritants:  Why: Prevents respiratory irritation.  What to Do:  Stay away from smoke and other environmental irritants.    Follow-Up Care:    Patient advised to return to clinic for reevaluation (either UC or PCP) if symptoms do not improve in 7 days. Patient educated on red flag symptoms and asked to go directly to the ED if these symptoms present themselves.       Seek Medical Attention:    When: If symptoms persist or worsen.  What to Do:  Consult with your healthcare provider if you experience persistent symptoms or if you notice any signs of worsening infection.  Remember, otitis media may take time to fully resolve. Follow these guidelines, and if you have concerns or need personalized advice, don't hesitate to contact your healthcare provider.    Wishing you a speedy recovery!      Israel Cardenas PA-C  [facility]    Subjective   29 year old who presents to clinic today for the following health issues:    Urgent Care       HPI     Acute Illness  Acute illness concerns: Was in last week and got amox for ear infection. Still having pressure and fluid in ear. Left side. Patient states that they have been having ear symptoms since a couple of days prior to their last visit. Patient states that all of their symptoms improved by about 60% following the amoxicillin but they have not completely resolved. Patient only has sharp pain when swallowing, otherwise it is just a fullness feeling.  Patient has has some muffled hearing and some ringing on that left side. Denies any discharge.   Onset/Duration:   Symptoms:  Fever: No  Chills/Sweats: No  Headache (location?): No  Sinus Pressure: No  Conjunctivitis:  No  Ear Pain: YES: left sided pressure   Rhinorrhea: No  Congestion: No  Sore Throat: No  Cough: no  Wheeze: No  Decreased Appetite: No  Nausea: No  Vomiting:  No  Diarrhea: No  Dysuria/Freq.: No  Dysuria or Hematuria: No  Fatigue/Achiness: No  Sick/Strep Exposure: No  Therapies tried and outcome: 7 days of amoxicillin     Review of Systems   Review of Systems   See HPI    Objective    Temp: 97.6  F (36.4  C) Temp src: Temporal BP: 132/84 Pulse: 78   Resp: 18 SpO2: 98 %       Physical Exam   Physical Exam  Constitutional:       General: She is not in acute distress.     Appearance: Normal appearance. She is normal weight. She is not ill-appearing, toxic-appearing or diaphoretic.   HENT:      Head: Normocephalic and atraumatic.      Right Ear: Tympanic membrane, ear canal and external ear normal.      Left Ear: Ear canal and external ear normal. A middle ear effusion is present. Tympanic membrane is erythematous and bulging. Tympanic membrane is not injected, scarred, perforated or retracted.   Cardiovascular:      Rate and Rhythm: Normal rate.      Pulses: Normal pulses.   Pulmonary:      Effort: Pulmonary effort is normal. No respiratory distress.   Neurological:      General: No focal deficit present.      Mental Status: She is alert and oriented to person, place, and time. Mental status is at baseline.      Gait: Gait normal.   Psychiatric:         Mood and Affect: Mood normal.         Behavior: Behavior normal.         Thought Content: Thought content normal.         Judgment: Judgment normal.          No results found for this or any previous visit (from the past 24 hours).

## 2025-04-21 ENCOUNTER — OFFICE VISIT (OUTPATIENT)
Dept: OPTOMETRY | Facility: CLINIC | Age: 30
End: 2025-04-21
Payer: COMMERCIAL

## 2025-04-21 DIAGNOSIS — H52.13 MYOPIA OF BOTH EYES: Primary | ICD-10-CM

## 2025-04-21 PROCEDURE — 99207 PR NO CHARGE LOS: CPT | Performed by: OPTOMETRIST

## 2025-04-21 ASSESSMENT — KERATOMETRY
OD_AXISANGLE_DEGREES: 100
OD_K1POWER_DIOPTERS: 43.75
OS_AXISANGLE2_DEGREES: 171
OS_K2POWER_DIOPTERS: 45.00
OS_K1POWER_DIOPTERS: 44.00
OD_K2POWER_DIOPTERS: 45.00
OS_AXISANGLE_DEGREES: 081
OD_AXISANGLE2_DEGREES: 010

## 2025-04-21 ASSESSMENT — VISUAL ACUITY
OD_CC: 20/20
OS_CC+: -1
OD_CC: 20/20
OD_SC: 20/25
OS_SC+: -1
OS_CC: 20/20
OS_CC: 20/20
OS_SC: 20/25
METHOD: SNELLEN - LINEAR
CORRECTION_TYPE: GLASSES

## 2025-04-21 ASSESSMENT — REFRACTION_MANIFEST
OS_SPHERE: -0.50
OD_SPHERE: -0.50

## 2025-04-21 ASSESSMENT — REFRACTION_WEARINGRX
SPECS_TYPE: SVL
OD_SPHERE: -1.00
OS_SPHERE: -1.00

## 2025-04-21 NOTE — LETTER
4/21/2025      Gayathri Llamas  [address]  [place] [address]      Dear Colleague,    Thank you for referring your patient, Gayathri Llamas, to the [facility]. Please see a copy of my visit note below.    Chief Complaint   Patient presents with     Glasses Problem           Medical, surgical and family histories reviewed and updated 4/21/2025.       OBJECTIVE: See Ophthalmology exam    ASSESSMENT:    ICD-10-CM    1. Myopia of both eyes  H52.13          PLAN:     Patient Instructions   Eyeglass prescription given.  There is an improvement in nearsightedness as demonstrated.      Recommend annual eye exams.    Se Bain, OD         Again, thank you for allowing me to participate in the care of your patient.        Sincerely,        Se Bain, OD    Electronically signed

## 2025-04-21 NOTE — PROGRESS NOTES
Chief Complaint   Patient presents with    Glasses Problem           Medical, surgical and family histories reviewed and updated 4/21/2025.       OBJECTIVE: See Ophthalmology exam    ASSESSMENT:    ICD-10-CM    1. Myopia of both eyes  H52.13          PLAN:     Patient Instructions   Eyeglass prescription given.  There is an improvement in nearsightedness as demonstrated.      Recommend annual eye exams.    Se Bain, OD

## 2025-04-21 NOTE — PATIENT INSTRUCTIONS
Eyeglass prescription given.  There is an improvement in nearsightedness as demonstrated.      Recommend annual eye exams.    Se Bain, OD

## 2025-07-12 ENCOUNTER — HEALTH MAINTENANCE LETTER (OUTPATIENT)
Age: 30
End: 2025-07-12